# Patient Record
(demographics unavailable — no encounter records)

---

## 2025-03-20 NOTE — ASSESSMENT
[FreeTextEntry1] : ASSESSMENT: The patient comes in today with chronic severe exacerbated complaints including multiple body parts shoulder elbow wrist and hand.  We have discussed multiple conditions including tendinopathy bursitis impingement symptoms as well as lateral epicondylosis tendinopathy of the elbow as well as carpal tunnel symptoms.  We have discussed treatment options thoroughly for the above including home exercise program PT activity modification bracing.  Today patient elects for injections.  Will continue to follow   The patient was adequately and thoroughly informed of my assessment of their current condition(s).  - This may diminish bodily function for the extremity. We discussed prognosis, tx modalities including operative and nonoperative options for the above diagnostic assessment. As always, 2nd opinion is always provided as an option.  When accessible, I was able to review other physicians note(s) including reviewing other tests, imaging results as well as personally view these results for my own interpretation.  [left] SUBACROMIAL SHOULDER INJECTION   Indication:  subacromial bursitis/impingement, pain   Risk, benefits and alternatives were discussed with the patient. Potential complications include bleeding and infection. Alcohol was used to prep the area.  Ethyl chloride spray was used as a topical anesthetic.  Using sterile technique, the injection needle was then directed from a standard posterior approach parallel to and inferior to the acromion. A 21g needle was used to inject 5 mL of 1% Lidocaine and 1 unit 10mg Kenalog.  No significant resistance was encountered.  A bandage was applied.  The patient tolerated the procedure well.    Patient instructed to avoid strenuous activity for 2 day(s). Specifically counseled regarding the signs and symptoms of potential infection and instructed to present promptly to clinic or hospital if such signs and symptoms arise.   Injection:   The risks and benefits of a steroid injection were discussed in detail. The risks include but are not limited to: pain, infection, swelling, flare response, bleeding, subcutaneous fat atrophy, skin depigmentation and/or elevation of blood sugar. The risk of incomplete resolution of symptoms, recurrence and additional intervention was reviewed and considered by the patient. The patient agreed to proceed and under a sterile prep, I injected 1 unit 6mg into 1 cc of a combination of Celestone and Lidocaine into the left carpal tunnel, left tennis elbow. The patient tolerated the injection well.  The patient was adequately and thoroughly informed of my assessment of their current condition(s).  DISCUSSION: 1.  Left shoulder, left carpal tunnel and left elbow injections as above separately 2.  Modification PT HEP.  Follow-up 6 weeks 3.  We have discussed multiple conditions and comorbidities today in 3 body parts and treatment options thoroughly.

## 2025-03-20 NOTE — PHYSICAL EXAM
[de-identified] : Examination of the [left] shoulder reveals equal active and passive motion as compared to the contralateral side There is a positive Speed, positive Mraie, positive Garcia, tenderness with anterior shoulder compression. 3+/5 strength Examination of the [left] elbow reveals tenderness at the level of the lateral epicondyle. There is pain with resisted wrist and finger extension at the elbow. Exam [left] wrist + Durkan's with + N/T. There is + tinel. Patient is able to delineate numbness to median-sided digits volarly. [No obvious thenar atrophy] [de-identified] : 3 views of [left] elbow were obtained today in my office and were seen by me and discussed with the patient.  These are [grossly unremarkable]

## 2025-03-20 NOTE — HISTORY OF PRESENT ILLNESS
[FreeTextEntry1] : Frank is a very pleasant 52-year-old male presenting today with an atraumatic history of left shoulder, left elbow and left wrist and hand discomfort.  Describes difficulty with overhead motion difficulty with lifting and difficulty with burning and numbness and tingling.  This wakes him up at night

## 2025-05-02 NOTE — ASSESSMENT
[FreeTextEntry1] : ASSESSMENT: The patient comes in today with chronic severe exacerbated complaints including multiple body parts shoulder elbow wrist and hand.  Symptoms of improved tremendously with injections. Today we have discussed once again carpal tunnel syndrome.  We have discussed bracing and activity modification.  Patient elects for repeat injection. We have discussed surgical modalities as well   The patient was adequately and thoroughly informed of my assessment of their current condition(s).  - This may diminish bodily function for the extremity. We discussed prognosis, tx modalities including operative and nonoperative options for the above diagnostic assessment. As always, 2nd opinion is always provided as an option.  When accessible, I was able to review other physicians note(s) including reviewing other tests, imaging results as well as personally view these results for my own interpretation.  Injection procedure for left carpal tunnel:   The risks and benefits of a steroid injection were discussed in detail. The risks include but are not limited to: pain, infection, swelling, flare response, bleeding, subcutaneous fat atrophy, skin depigmentation and/or elevation of blood sugar. The risk of incomplete resolution of symptoms, recurrence and additional intervention was reviewed and considered by the patient. The patient agreed to proceed and under a sterile prep, I injected 1 unit 6mg into 1 cc of a combination of Celestone and Lidocaine into the above stated location for the procedure. The patient tolerated the injection well.  The patient was adequately and thoroughly informed of my assessment of their current condition(s).  DISCUSSION: 1.  Injection as above.  Bracing activity modification.  Positive response 2. f/u 2mo

## 2025-05-02 NOTE — HISTORY OF PRESENT ILLNESS
[FreeTextEntry1] : Frank is a very pleasant 52-year-old male presenting today with an atraumatic history of left shoulder, left elbow and left wrist and hand discomfort.  Describes difficulty with overhead motion difficulty with lifting and difficulty with burning and numbness and tingling.  This wakes him up at night States injections have been helpful

## 2025-05-02 NOTE — PHYSICAL EXAM
[de-identified] : Exam [left] wrist + Durkan's with + N/T. There is + tinel. Patient is able to delineate numbness to median-sided digits volarly. [No obvious thenar atrophy] [de-identified] : 3 views of [left] elbow were reviewed today in my office and were seen by me and discussed with the patient.  These are [grossly unremarkable]